# Patient Record
Sex: MALE | Employment: STUDENT | URBAN - METROPOLITAN AREA
[De-identification: names, ages, dates, MRNs, and addresses within clinical notes are randomized per-mention and may not be internally consistent; named-entity substitution may affect disease eponyms.]

---
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
## 2021-02-08 ENCOUNTER — DOCUMENTATION ONLY (OUTPATIENT)
Dept: FAMILY MEDICINE | Facility: CLINIC | Age: 21
End: 2021-02-08

## 2021-02-17 ENCOUNTER — DOCUMENTATION ONLY (OUTPATIENT)
Dept: FAMILY MEDICINE | Facility: CLINIC | Age: 21
End: 2021-02-17

## 2021-02-23 NOTE — PROGRESS NOTES
"[facility] ATHLETIC MEDICINE  [facility]   Sport Psychology Intake Note      Location of Visit: [facility] Athletic Department - Telehealth  Date of Visit: February 8, 2021  Duration of Session: 45 minutes  Referred by: teammate/friend    Sandi Sanchez presented on time for initial telehealth/videoconference. Sandi Sanchez was located at the following address for the appointment: [address] [place] [id number]     Emergency contacts provided:  General: Angie Sanchez (Mother) - [phone] and Jesus Sanchez (Father) - [phone]    The risks and benefits of telehealth and what to do if there is a break in the connection were reviewed. Physical environment/space was confirmed to be secure and private on both ends. The session was both audio and visual on [employer], a secure system that is HIPAA compliant/certified. The telehealth consent form was signed prior to the session; see signed form in chart. The nature and limits of confidentiality, were discussed and Sandi Sanchez stated understanding and consent.      Sandi Sanchez is a 20 year old American male.  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 He is not an international student.     Self-reported Concerns/Symptoms:  Athletic performance  Confidence  Dissatisfaction/unhappiness  Loneliness    Presenting Concern:  Sandi states, \"I have a few things that I want to work on. First of all, I want to just improve my ability to compete/perform and train myself to become the best I can by flipping the switch and turning into a \"killer/closer/competitor\" at any moment. I also have a few things away from sports that I want to work on. I struggle with talking about my feelings and think sports psych could be healthy. Also I want to get better at being present and just overall happier.\"    Suicide and Risk Assessment:  Recent suicidal thoughts: No  Past suicidal thoughts: No  Recent homicidal " thoughts: No  Any attempts in the past: No    Sandi denies current urges to self-harm, homicidal ideation, suicidal ideation, means, plans, or intent.    Mental Status & Observations:  Sandi appeared generally alert and oriented. Dress was appropriate to the weather and occasion. Grooming and hygiene were appropriate. Eye contact was good. Speech was of normal volume and normal. Thought processes were relevant, logical and goal-directed. Thought content was within normal limits with no evidence of psychotic or paranoid features. Memory appeared intact. He exhibited normal motor activity during the appointment. Mood was appropriate with congruent affect. Insight and judgment appeared age appropriate with good focus in session.  Behavior was candid, cooperative and engaging    Family Background:  Sandi reports that he is from [place], [place]; his mother and father are  and currently reside there. Reports that his older brother attends the [employer] and is also on the track team with him. Described supportive/loving relationships with family members. Shared that his childhood was pleasant, competitive with brother at times but healthy. Noted that his father also ran track in college but did not push the sport on him.    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 He is majoring in finance. Reports no concerns related to academic progress. Noted that he has done well academically historically.    Social:  Reports recent romantic break up, no current romantic partner. Noted having a supportive social network of friends.    Athletics:   Sandi is on his second year as a member of the cross country and track and field teams at the [employer]. Reports good relationship with coaches and teammates. Reports having contemplated transferring but ultimately decided to remain. Discussed desire to improve sport performance.    History of medical and mental  health concerns:  Concussion: Yes: two in 2013  Current/past sports injury: No  Nutrition/eating/appetite: No  Body image: No  Sleep: No   Substance use (alcohol, caffeine, tobacco, cannabis, other): No  Family history of substance abuse: No  Medications/vitamins/supplements: OTC allergy medication  Concentration/focus/ADHD: No  Caffeine use: No  PTSD/trauma/abuse: No  Significant loss: No  Known history of mental health in self: No  History of therapy or prescribed medications: No  Known history of mental health in family member(s): No  Legal issues: No  Financial concerns: No    Coping skills, strengths and supports:   Social support system and Use of available services    Clinical impressions or Other:  Sandi arrived to session on time and was actively engaged throughout. Session utilized to review confidentiality, establish rapport, and determine goals for services.    Sandi reports no prior counseling/mental health or performance services and was able to identify improved sport performance as main goal in addition to managing life stressors.    Therapy objectives/goals:  Improve sport performance  Provide support    Therapy follow-up plan:  Individual counseling sessions as needed      Rebecca Oquendo PsyD

## 2021-02-24 NOTE — PROGRESS NOTES
I have reviewed and agree with the document of this note.  I did not personally see the patient.    Jake Crowley, PhD LP, CMPC

## 2021-02-25 NOTE — PROGRESS NOTES
[facility] ATHLETIC MEDICINE  [facility]   Sport Psychology Progress Note      Location of Visit: [facility] Athletic Department - Telehealth  Date of Visit: February 17, 2021  Duration of Session: 45 minutes    Sandi Sanchez presented on time for initial telehealth/videoconference. Sandi Sanchez was located at the following address for the appointment: [address] [place] [address]    Emergency contacts provided:  General: Angie Sanchez (Mother) - [phone] and Jesus Sanchez (Father) - [phone]     The risks and benefits of telehealth and what to do if there is a break in the connection were reviewed. Physical environment/space was confirmed to be secure and private on both ends. The session was both audio and visual on [employer], a secure system that is HIPAA compliant/certified. The telehealth consent form was signed prior to the session; see signed form in chart. The nature and limits of confidentiality, were discussed and Sandi Sanchez stated understanding and consent.      Suicide Assessment:  Sandi denies current urges to self-harm, homicidal ideation, suicidal ideation, means, plans, or intent.    Mental Status & Observations:  Sandi appeared generally alert and oriented. Dress was appropriate to the weather and occasion. Grooming and hygiene were appropriate. Eye contact was good. Speech was of normal volume and normal. Mood was appropriate with congruent affect. Thought processes were relevant, logical and goal-directed. Thought content was within normal limits with no evidence of psychotic or paranoid features. Memory appeared intact. Insight and judgment appeared age appropriate with good focus in session.  He exhibited normal motor activity during the appointment.  Behavior was cooperative and engaging.      Observations and response to counseling:  Sandi arrived to session on time and was actively engaged throughout. He reports no significant changes in mood or stressors  since last encounter. Noted that he completed a sport race last weekend in [place], and although he performed well, lamented on being able to perform better.    Utilized session to reflect on recent sport performance and plan for ways to enhance performance for upcoming races. Mental skills emphasized in session were: diaphragmatic breathing, focus and attention, and utilization of process goals.    Intervention:  Empathetic listening and supportive counseling were provided throughout. Aided in reflection of previous sport performances and identified strength areas and opportunities for growth.    Provided Sandi with exercises/homework to complete (Focus Action Plan and Process Goals Worksheet).    Therapy objectives/goals:  Improve sport performance  Provide support    Therapy follow-up plan:  Individual counseling sessions as needed      Rebecca Oquendo PsyD

## 2021-06-28 DIAGNOSIS — Z13.6 SCREENING FOR HEART DISEASE: Primary | ICD-10-CM

## 2021-07-27 DIAGNOSIS — Z13.6 SCREENING FOR HEART DISEASE: ICD-10-CM

## 2021-07-29 LAB
ATRIAL RATE - MUSE: 45 BPM
DIASTOLIC BLOOD PRESSURE - MUSE: NORMAL MMHG
INTERPRETATION ECG - MUSE: NORMAL
P AXIS - MUSE: 70 DEGREES
PR INTERVAL - MUSE: 166 MS
QRS DURATION - MUSE: 96 MS
QT - MUSE: 446 MS
QTC - MUSE: 385 MS
R AXIS - MUSE: 60 DEGREES
SYSTOLIC BLOOD PRESSURE - MUSE: NORMAL MMHG
T AXIS - MUSE: 20 DEGREES
VENTRICULAR RATE- MUSE: 45 BPM

## 2021-07-30 NOTE — PROGRESS NOTES
EKG clearance  Sandi Sanchez's EKG performed for clearance to participate in intercollegiate athletics at the [employer] has been reviewed on 07/29/21.  Findings are normal: Sinus bradycardia   RSR' in V1-V2   Left ventricular hypertrophy   Early repolarization   Within normal limits for athlete    Additional follow up is not needed at this time.   Follow up tests: none    Sandi Sanchez is cleared to participate at this time.     Divya Bull MD

## 2021-09-21 DIAGNOSIS — R53.83 FATIGUE, UNSPECIFIED TYPE: Primary | ICD-10-CM

## 2021-09-22 ENCOUNTER — LAB (OUTPATIENT)
Dept: LAB | Facility: CLINIC | Age: 21
End: 2021-09-22
Payer: COMMERCIAL

## 2021-09-22 DIAGNOSIS — R53.83 FATIGUE, UNSPECIFIED TYPE: ICD-10-CM

## 2021-09-22 LAB
FERRITIN SERPL-MCNC: 51 NG/ML (ref 26–388)
HGB BLD-MCNC: 15.6 G/DL (ref 13.3–17.7)

## 2021-09-22 PROCEDURE — 85018 HEMOGLOBIN: CPT

## 2021-09-22 PROCEDURE — 82728 ASSAY OF FERRITIN: CPT

## 2021-10-19 ENCOUNTER — DOCUMENTATION ONLY (OUTPATIENT)
Dept: FAMILY MEDICINE | Facility: CLINIC | Age: 21
End: 2021-10-19

## 2021-10-19 NOTE — PROGRESS NOTES
"[facility] ATHLETIC MEDICINE  [facility]   Sport Psychology Progress Note      Location of Visit: [facility] Athletic Department  Date of Visit: October 19, 2021  Duration of Session: 45 minutes  Client: Sandi Sanchez    Emergency contacts provided:  General: Angie Sanchez (Mother) - [phone] and Jesus Sanchez (Father) - [phone]     Client and provider reviewed the nature and limits of confidentiality, were discussed and Sandi Sanchez stated understanding and consent.      Suicide Assessment:  Sandi denies current urges to self-harm, homicidal ideation, suicidal ideation, means, plans, or intent.    Mental Status & Observations:  Sandi appeared generally alert and oriented. Dress was appropriate to the weather and occasion. Grooming and hygiene were appropriate. Eye contact was good. Speech was of normal volume and normal. Mood was appropriate with congruent affect. Thought processes were relevant, logical and goal-directed. Thought content was within normal limits with no evidence of psychotic or paranoid features. Memory appeared intact. Insight and judgment appeared age appropriate with good focus in session.  He exhibited normal motor activity during the appointment.  Behavior was cooperative and engaging.      Observations and response to counseling:  Sandi arrived to session on time and was actively engaged throughout. The client reported distress with \"personal and sport\" concerns. With tears, the client reported he struggles vocalizing his concerns to others and was \"hoping to use sport psychology\" to make changes with this dynamic. Throughout the session, the client seemed to feel more comfortable vocalizing concerns and stated he noticed a change by \"saying things on his mind.\"    Intervention:  Time was spent building rapport and discussing transitioning to a new sport psychology provider. The client reported a history of concerns with comparisons to others (his brother) growing up " "and a sense of \"always wanting more\" with sport performance goals. We discussed the \"cycle of emotions\" that occur when he continues to change his goals that are \"just out of reach\" and feeling a lack of accomplishment even when he does well.     Client and provider discussed establishing initial treatment goals for this semester, the client agreeing the phrase \"finding my voice\" would be an overarching goal to help him address his emotions with others.    Time spent going through an updated intake of sorts, the client denying any concerns/changes since his initial consultation with Dr. Fernandez last February 2021. He reported he is in a good relationship with his girlfriend, denied any SI/HI, and feels supported by his teammates, friends, and family.    Therapy objectives/goals:  Improve sport performance  Provide support    Therapy follow-up plan:  Individual counseling sessions as needed  Consult athletic medicine as needed  Introduce value identification and value-based behaviors      Morro Bundy, PhD,   Sport Psychologist    "

## 2021-11-02 ENCOUNTER — DOCUMENTATION ONLY (OUTPATIENT)
Dept: FAMILY MEDICINE | Facility: CLINIC | Age: 21
End: 2021-11-02

## 2021-11-02 NOTE — PROGRESS NOTES
"[facility] ATHLETIC MEDICINE  [facility]   Sport Psychology Progress Note      Location of Visit: [facility] Athletic Department  Date of Visit: November 2, 2021  Duration of Session: 45 minutes  Client: Sandi Sanchez      Suicide Assessment:  Sandi denies current urges to self-harm, homicidal ideation, suicidal ideation, means, plans, or intent.    Mental Status & Observations:  Sandi appeared generally alert and oriented. Dress was appropriate to the weather and occasion. Grooming and hygiene were appropriate. Eye contact was good. Speech was of normal volume and normal. Mood was appropriate with congruent affect. Thought processes were relevant, logical and goal-directed. Thought content was within normal limits with no evidence of psychotic or paranoid features. Memory appeared intact. Insight and judgment appeared age appropriate with good focus in session.  He exhibited normal motor activity during the appointment.  Behavior was cooperative and engaging.      Observations and response to counseling:  Sandi arrived to session on time and was actively engaged throughout. Client reported he processed content from the previous session, noticing ways he can \"give himself space to achieve goals\" versus pushing goals back and \"never feeling good enough.\"     Intervention:  Time was spent processing his recent meet. The client reported he felt better having space to process his concerns last session and talk openly about \"feeling insecure and not feeling good enough\" when it comes to his day-to-day successes and decisions. The client reported he thought it would be helpful to utilize therapy to \"find his voice\" and also \"figure out\" why he feels anxious/nervous during meet weeks. When queried, the client stated he stresses and feels anxious about things he normally doesn't feel overwhelmed by when it is not the week of a meet. The client and provider went through the ACT Matrix activity to identify " "ways he engages in actions/behaviors that are to \"get away\" from feeling certain ways and ways he moves towards important people/values in his life. The client was encouraged to process and define some of his values he believes he tries to live by. The client thought the Matrix image was helpful and was amenable to the treatment recommendation/process for next session.     Therapy objectives/goals:  Improve sport performance  Provide support    Therapy follow-up plan:  Individual counseling sessions as needed  Consult athletic medicine as needed  Introduce value identification and value-based behaviors      Morro Bundy, PhD, LP  Sport Psychologist    "

## 2021-11-16 ENCOUNTER — DOCUMENTATION ONLY (OUTPATIENT)
Dept: FAMILY MEDICINE | Facility: CLINIC | Age: 21
End: 2021-11-16
Payer: COMMERCIAL

## 2021-11-16 NOTE — PROGRESS NOTES
"[facility] ATHLETIC MEDICINE  [facility]   Sport Psychology Progress Note      Location of Visit: [facility] Athletic Department  Date of Visit: November 16, 2021  Duration of Session: 45 minutes  Client: Sandi Sanchez      Suicide Assessment:  Sandi denies current urges to self-harm, homicidal ideation, suicidal ideation, means, plans, or intent.    Mental Status & Observations:  Sandi appeared generally alert and oriented. Dress was appropriate to the weather and occasion. Grooming and hygiene were appropriate. Eye contact was good. Speech was of normal volume and normal. Mood was appropriate with congruent affect. Thought processes were relevant, logical and goal-directed. Thought content was within normal limits with no evidence of psychotic or paranoid features. Memory appeared intact. Insight and judgment appeared age appropriate with good focus in session.  He exhibited normal motor activity during the appointment.  Behavior was cooperative and engaging.      Observations and response to counseling:  Sandi arrived to session on time and was actively engaged throughout. Client reported he processed content from the previous session, noticing ways he can \"give himself space to achieve goals\" versus pushing goals back and \"never feeling good enough.\"     Intervention:  Time was spent processing his recent meet and sense of confidence going into [employer]. The session segued into his sense of self, self-talk, and self-doubt/negative self talk. The client found it difficult to go through self-compassion exercises in session and noted he struggles with vocalizing \"what he believes he deserves\" (e.g. healthy relationships and running well). With some tears, the client stated he feels his negative self-talk is unhealthy and has been around him for a long time. This provider introduced the road metaphor and time was spent validating the \"highway\" of his negative self-talk and the difficulty or " "challenging \"gravel road\" with vocalizing what he deserves. The client was amenable to the positive self-talk reminder each morning and stated he would vocalize what he thinks he deserves as a person to \"work on the gravel.\"  Therapy objectives/goals:  Improve sport performance  Provide support    Therapy follow-up plan:  Individual counseling sessions as needed  Consult athletic medicine as needed  Introduce value identification and value-based behaviors      Morro Bundy, PhD, LP  Sport Psychologist    "

## 2021-11-30 ENCOUNTER — DOCUMENTATION ONLY (OUTPATIENT)
Dept: FAMILY MEDICINE | Facility: CLINIC | Age: 21
End: 2021-11-30
Payer: COMMERCIAL

## 2021-11-30 NOTE — PROGRESS NOTES
"[facility] ATHLETIC MEDICINE  [facility]   Sport Psychology Progress Note      Location of Visit: [facility] Athletic Department  Date of Visit: November 30, 2021  Duration of Session: 45 minutes  Client: Sandi Sanchez      Suicide Assessment:  Sandi denies current urges to self-harm, homicidal ideation, suicidal ideation, means, plans, or intent.    Mental Status & Observations:  Sandi appeared generally alert and oriented. Dress was appropriate to the weather and occasion. Grooming and hygiene were appropriate. Eye contact was good. Speech was of normal volume and normal. Mood was appropriate with congruent affect. Thought processes were relevant, logical and goal-directed. Thought content was within normal limits with no evidence of psychotic or paranoid features. Memory appeared intact. Insight and judgment appeared age appropriate with good focus in session.  He exhibited normal motor activity during the appointment.  Behavior was cooperative and engaging.      Observations and response to counseling:  Sandi arrived to session on time and was actively engaged throughout. Client reported he processed content from the previous session, noticing ways he can \"give himself space to achieve goals\" versus pushing goals back and \"never feeling good enough.\"     Intervention:  The client reported his affirmations were helpful  when he remembered  to do them in the morning. In session, the client updated his alarms on his cell phone to include the affirmation prompt. When queried, the client stated the past two weeks have gone well for him and he feels more connected to the present moment. We agreed to check-in after winter break to continue addressing sport performance and self-talk concerns when the indoor season starts. Client stated he continues to work on the \"gravel\" road and give his mind more time to reflect on his positive aspects of self.     Therapy objectives/goals:  Improve sport " performance  Provide support    Therapy follow-up plan:  Individual counseling sessions as needed  Consult athletic medicine as needed  Introduce value identification and value-based behaviors      Morro Bundy, PhD,   Sport Psychologist

## 2022-03-05 ENCOUNTER — NURSE TRIAGE (OUTPATIENT)
Dept: NURSING | Facility: CLINIC | Age: 22
End: 2022-03-05
Payer: COMMERCIAL

## 2022-03-05 NOTE — TELEPHONE ENCOUNTER
"Pt calling that he has had blood in his stool for the last couple days.  This occurred about one month ago and it went away.      Reason for Disposition    [1] Rectal bleeding is minimal (e.g., blood just on toilet paper, few drops, streaks on surface of normal formed BM) AND [2] bleeding recurs 3 or more times on treatment    Additional Information    Negative: Shock suspected (e.g., cold/pale/clammy skin, too weak to stand, low BP, rapid pulse)    Negative: Difficult to awaken or acting confused (e.g., disoriented, slurred speech)    Negative: Passed out (i.e., lost consciousness, collapsed and was not responding)    Negative: [1] Vomiting AND [2] contains red blood or black (\"coffee ground\") material  (Exception: few red streaks in vomit that only happened once)    Negative: Sounds like a life-threatening emergency to the triager    Negative: Diarrhea is main symptom    Negative: Stool color other than brown or tan is main concern  (no bleeding and no melena)    Negative: SEVERE rectal bleeding (large blood clots; on and off, or constant bleeding)    Negative: SEVERE dizziness (e.g., unable to stand, requires support to walk, feels like passing out now)    Negative: [1] MODERATE rectal bleeding (small blood clots, passing blood without stool, or toilet water turns red) AND [2] more than once a day    Negative: Pale skin (pallor) of new onset or worsening    Negative: Tarry or jet black-colored stool (not dark green)    Negative: [1] Constant abdominal pain AND [2] present > 2 hours    Negative: Rectal foreign body (i.e., now or within past week;  inserted or swallowed)    Negative: High-risk adult (e.g., prior surgery on aorta, abdominal aortic aneurysm)    Negative: Taking Coumadin (warfarin) or other strong blood thinner, or known bleeding disorder (e.g., thrombocytopenia)    Negative: Known cirrhosis of the liver (or history of liver failure or ascites)    Negative: [1] Colonoscopy AND [2] in past 72 hours    " Negative: Patient sounds very sick or weak to the triager    Negative: MODERATE rectal bleeding (small blood clots, passing blood without stool, or toilet water turns red)    Negative: MILD rectal bleeding (more than just a few drops or streaks)    Negative: Cancer of rectum or intestines (colon)    Negative: Radiation therapy to lower abdomen or pelvis    Protocols used: RECTAL BLEEDING-A-AH

## 2022-04-07 ENCOUNTER — DOCUMENTATION ONLY (OUTPATIENT)
Dept: FAMILY MEDICINE | Facility: CLINIC | Age: 22
End: 2022-04-07
Payer: COMMERCIAL

## 2022-04-08 NOTE — PROGRESS NOTES
"[facility] ATHLETIC MEDICINE  [facility]   Sport Psychology Progress Note      Location of Visit: [facility]  Date of Visit: April 7, 2021  Duration of Session: 45 minutes  Client: Sandi Sanchez      Suicide Assessment:  Sandi denies current urges to self-harm, homicidal ideation, suicidal ideation, means, plans, or intent.    Mental Status & Observations:  Sandi appeared generally alert and oriented. Dress was appropriate to the weather and occasion. Grooming and hygiene were appropriate. Eye contact was good. Speech was of normal volume and normal. Mood was appropriate with congruent affect. Thought processes were relevant, logical and goal-directed. Thought content was within normal limits with no evidence of psychotic or paranoid features. Memory appeared intact. Insight and judgment appeared age appropriate with good focus in session.  He exhibited normal motor activity during the appointment.  Behavior was cooperative and engaging.      Observations and response to counseling:  Sandi arrived to session on time and was actively engaged throughout. Client reported he processed content from the previous sessions last semester, noticing he continues to have a desire to be in the present moment and reduce his \"time in his head thinking about the future.\"    Intervention:  The client presented to his session on time stating he reached out for a session to \"challenge his comfort zone.\" The client stated he feels uncomfortable discussing internal emotions and mental struggles but stated \"it was always helpful so I wanted to push myself to come back.\" Client and provider discussed, validated, and processed automatic thoughts and futuristic thinking. The client stated he was seeking ideas to help him \"be more present.\" The client was introduced to grounding techniques (5-sense countdown, feet on the ground). The client related his experience to previous sessions, stating he " "understands how it will take time and practice to get his mind to \"be more present.\" He was thankful for the session, agreed to practice his interventions, and will follow-up in May before finals week.    Therapy objectives/goals:  Improve sport performance  Provide support    Therapy follow-up plan:  Individual counseling sessions monthly  Consult athletic medicine as needed      Morro Bundy, PhD, LP  Sport Psychologist    "

## 2022-05-05 ENCOUNTER — DOCUMENTATION ONLY (OUTPATIENT)
Dept: FAMILY MEDICINE | Facility: CLINIC | Age: 22
End: 2022-05-05
Payer: COMMERCIAL

## 2022-05-06 NOTE — PROGRESS NOTES
"[facility] ATHLETIC MEDICINE  [facility]   Sport Psychology Progress Note      Location of Visit: [facility] Athletic Department  Date of Visit: May 5, 2022  Duration of Session: 45 minutes  Client: Sandi Sanchez      Suicide Assessment:  Sandi denies current urges to self-harm, homicidal ideation, suicidal ideation, means, plans, or intent.    Mental Status & Observations:  Sandi appeared generally alert and oriented. Dress was appropriate to the weather and occasion. Grooming and hygiene were appropriate. Eye contact was good. Speech was of normal volume and normal. Mood was appropriate with congruent affect. Thought processes were relevant, logical and goal-directed. Thought content was within normal limits with no evidence of psychotic or paranoid features. Memory appeared intact. Insight and judgment appeared age appropriate with good focus in session.  He exhibited normal motor activity during the appointment.  Behavior was cooperative and engaging.      Observations and response to counseling:  Sandi arrived to session on time and was actively engaged throughout. Client reported he processed content from the previous sessions last semester, noticing he continues to have a desire to be in the present moment and reduce his \"time in his head thinking about the future.\"    Intervention:  The client presented to his session on time. Client reported progress with being present, yet some struggles sticking to his pre-race routine. The client reported he struggles with deferring to his older brother on the team if he has suggestions for the training group. The client stated he thinks his routine works for him but defers if he hears his brother has something different. The client stated he believes he automatically does this due to his upbringing and relationship with his brother/family. The client stated processing in session was helpful and \"wasn't expecting to get into family things.\" This " "provider connected his family history to ways to challenge his automatic thoughts (e.g. catch it, check it, challenge it). The client was amenable to the recommendation and was able to relate this to his \"consistent process\" for his pre-race routine and focus on his steps.    Therapy objectives/goals:  Improve sport performance  Provide support    Therapy follow-up plan:  Individual counseling sessions monthly  Consult athletic medicine as needed      Morro Bundy, PhD, LP  Sport Psychologist    "

## 2022-11-08 ENCOUNTER — DOCUMENTATION ONLY (OUTPATIENT)
Dept: FAMILY MEDICINE | Facility: CLINIC | Age: 22
End: 2022-11-08

## 2022-11-08 NOTE — PROGRESS NOTES
"[facility] ATHLETIC MEDICINE  [facility]   Sport Psychology Progress Note      Location of Visit: [facility] Athletic Department  Date of Visit: November 8, 2022  Duration of Session: 45 minutes  Client: Sandi Sanchez      Suicide Assessment:  Sandi denies current urges to self-harm, homicidal ideation, suicidal ideation, means, plans, or intent.    Mental Status & Observations:  Sandi appeared generally alert and oriented. Dress was appropriate to the weather and occasion. Grooming and hygiene were appropriate. Eye contact was good. Speech was of normal volume and normal. Mood was appropriate with congruent affect. Thought processes were relevant, logical and goal-directed. Thought content was within normal limits with no evidence of psychotic or paranoid features. Memory appeared intact. Insight and judgment appeared age appropriate with good focus in session.  He exhibited normal motor activity during the appointment.  Behavior was cooperative and engaging.      Observations and response to counseling:  Sandi arrived to session on time and was actively engaged throughout. Client reported struggling with recent outcomes, stating his frustration stems from being physically fit and in shape but not performing how he believes he is physically capable of.     Intervention:  The client presented to his session on time. Client reported concerns for coming back to sport psych since May 2022 was due to recent meets. Client stated he has not given up in races but feels \"heavy\" around the 2nd mile and is struggling with maintaining his sense of motivation throughout. Client and provider processed his self-doubt and concerns. Client did not seem aware of how his negative self-talk impacts his performance. Provider had client engage in the \"imagery iron arm\" activity, where the client noticed how his strength and response in the activity changed depending on his thoughts (positive vs negative). When " "encouraged to write out 3 affirmations for during a race the client can say to himself as a way to channel this type of mindset, the client struggled naming 3 actions he believed would be helpful. The client stated \"move on\" would be a helpful statement and was willing to put on his lock screen as well as post-its for his daily reminder.    Therapy objectives/goals:  Improve sport performance  Provide support    Therapy follow-up plan:  Individual counseling sessions monthly  Consult athletic medicine as needed      Morro Bundy, PhD, LP  Sport Psychologist    "

## 2022-12-01 ENCOUNTER — DOCUMENTATION ONLY (OUTPATIENT)
Dept: FAMILY MEDICINE | Facility: CLINIC | Age: 22
End: 2022-12-01

## 2022-12-04 NOTE — PROGRESS NOTES
[facility] ATHLETIC MEDICINE  [facility]   Sport Psychology Progress Note      Location of Visit: [facility] Athletic Department  Date of Visit: December 1, 2022  Duration of Session: 45 minutes  Client: Sandi Sanchez      Suicide Assessment:  Sandi denies current urges to self-harm, homicidal ideation, suicidal ideation, means, plans, or intent.    Mental Status & Observations:  Sandi appeared generally alert and oriented. Dress was appropriate to the weather and occasion. Grooming and hygiene were appropriate. Eye contact was good. Speech was of normal volume and normal. Mood was appropriate with congruent affect. Thought processes were relevant, logical and goal-directed. Thought content was within normal limits with no evidence of psychotic or paranoid features. Memory appeared intact. Insight and judgment appeared age appropriate with good focus in session.  He exhibited normal motor activity during the appointment.  Behavior was cooperative and engaging.      Observations and response to counseling:  Sandi arrived to session on time and was actively engaged throughout. Client reported struggling with sense of accepting accomplishments and successes due to history of comparisons to others (brother and dad).    Intervention:  Client stated he raced his best [employer] sections race a couple weeks ago. Although happy, client stated he struggled accepting and feeling happy. Client opened up about history of comparisons to his older brother and feeling like he has had support from his parents, but it is usually compared to others. Client reported some experiences growing up, feeling invalidated by his brother and dad. Client and provider processed client's mixed emotions of loving his family while also feeling upset by how he was treated. Client engaged in letter to past self exercise as a way to see how he would show himself support growing up. Client stated it made him think more about how he is  proud of himself and what he has learned so far.    Therapy objectives/goals:  Improve sport performance  Provide support    Therapy follow-up plan:  Individual counseling sessions monthly  Consult athletic medicine as needed      Morro Bundy, PhD, LP  Sport Psychologist

## 2022-12-08 DIAGNOSIS — R53.83 FATIGUE, UNSPECIFIED TYPE: Primary | ICD-10-CM

## 2022-12-12 ENCOUNTER — LAB (OUTPATIENT)
Dept: LAB | Facility: CLINIC | Age: 22
End: 2022-12-12
Payer: COMMERCIAL

## 2022-12-12 DIAGNOSIS — R53.83 FATIGUE, UNSPECIFIED TYPE: ICD-10-CM

## 2022-12-12 LAB
FERRITIN SERPL-MCNC: 56 NG/ML (ref 31–409)
HGB BLD-MCNC: 15.3 G/DL (ref 13.3–17.7)

## 2022-12-12 PROCEDURE — 82728 ASSAY OF FERRITIN: CPT

## 2022-12-12 PROCEDURE — 85018 HEMOGLOBIN: CPT

## 2022-12-15 ENCOUNTER — DOCUMENTATION ONLY (OUTPATIENT)
Dept: FAMILY MEDICINE | Facility: CLINIC | Age: 22
End: 2022-12-15

## 2022-12-16 NOTE — PROGRESS NOTES
"[facility] ATHLETIC MEDICINE  [facility]   Sport Psychology Progress Note      Location of Visit: [facility] Athletic Department  Date of Visit: December 15, 2022  Duration of Session: 45 minutes  Client: Sandi Sanchez    Suicide Assessment:  Sandi denies current urges to self-harm, homicidal ideation, suicidal ideation, means, plans, or intent.    Mental Status & Observations:  Sandi appeared generally alert and oriented. Dress was appropriate to the weather and occasion. Grooming and hygiene were appropriate. Eye contact was good. Speech was of normal volume and normal. Mood was appropriate with congruent affect. Thought processes were relevant, logical and goal-directed. Thought content was within normal limits with no evidence of psychotic or paranoid features. Memory appeared intact. Insight and judgment appeared age appropriate with good focus in session.  He exhibited normal motor activity during the appointment.  Behavior was cooperative and engaging.      Observations and response to counseling:  Sandi arrived to session on time and was actively engaged throughout. Client reported struggling with sense of accepting accomplishments and successes due to history of comparisons to others (brother and dad).     Intervention:  Client reported a desire to \"unpack\" his desire to \"always win\" in things and in arguments. The client reported he feels like he has an unhealthy way of \"winning arguments\", stating he will either put others down or keep \"harping\" on people he cares about if he is right about something. Client stated he does not know why he does this, and often feels pushed to say things with the people he is closest to. Provider gently challenged client with connecting his current behaviors with past experiences with his parents and brother. Client was engaged with the \"Stop mindfulness\" approach of recognizing the people he argues with the most are the ones that matter to him the most. " Client stated it was a conversation with his girlfriend and mom that stood out to him the most as having a desire to change and was open to processing and writing down current examples where he finds himself in an argument.    Therapy objectives/goals:  Improve sport performance  Provide support    Therapy follow-up plan:  Individual counseling sessions monthly  Consult athletic medicine as needed      Morro Bundy, PhD,   Sport Psychologist

## 2023-01-17 ENCOUNTER — DOCUMENTATION ONLY (OUTPATIENT)
Dept: FAMILY MEDICINE | Facility: CLINIC | Age: 23
End: 2023-01-17

## 2023-01-17 NOTE — PROGRESS NOTES
"[facility] ATHLETIC MEDICINE  [facility]   Sport Psychology Progress Note      Location of Visit: [facility] Athletic Department - Room 293D  Date of Visit: January 17, 2023  Duration of Session: 45 minutes  Client: Sandi Sanchez    Suicide Assessment:  Sandi denies current urges to self-harm, homicidal ideation, suicidal ideation, means, plans, or intent.    Mental Status & Observations:  Sandi appeared generally alert and oriented. Dress was appropriate to the weather and occasion. Grooming and hygiene were appropriate. Eye contact was good. Speech was of normal volume and normal. Mood was appropriate with congruent affect. Thought processes were relevant, logical and goal-directed. Thought content was within normal limits with no evidence of psychotic or paranoid features. Memory appeared intact. Insight and judgment appeared age appropriate with good focus in session.  He exhibited normal motor activity during the appointment.  Behavior was cooperative and engaging.      Observations and response to counseling:  Sandi arrived to session on time and was actively engaged throughout. Client reported struggling with sense of accepting accomplishments and successes due to history of comparisons to others (brother and dad).     Intervention:  Client reported progress and resisting urges to \"act on winning arguments\" over break. Client stated it was helpful to be mindful of times he can get competitive and he \"kept himself\" from saying certain things to family members and his girlfriend, noticing he was able to maintain positive outcomes and engagements. Session segued to his outcome goal of wanting a sub-4 minute mile, validating his outcome goal and reframing his mindset to training and being present, over needing to \"feel a certain way.\" Client stated the difference of \"expectations versus process\" was helpful to identify.    Therapy objectives/goals:  Improve sport performance  Provide " support    Therapy follow-up plan:  Individual counseling sessions monthly  Consult athletic medicine as needed      Morro Bundy, PhD, LP  Sport Psychologist

## 2023-02-09 ENCOUNTER — DOCUMENTATION ONLY (OUTPATIENT)
Dept: FAMILY MEDICINE | Facility: CLINIC | Age: 23
End: 2023-02-09

## 2023-02-09 NOTE — PROGRESS NOTES
"[facility] ATHLETIC MEDICINE  [facility]   Sport Psychology Progress Note      Location of Visit: [facility] Athletic Department - Room 293D  Date of Visit: February 9, 2023  Duration of Session: 45 minutes  Client: Sandi Sanchez    Suicide Assessment:  Sandi denies current urges to self-harm, homicidal ideation, suicidal ideation, means, plans, or intent.    Mental Status & Observations:  Sandi appeared generally alert and oriented. Dress was appropriate to the weather and occasion. Grooming and hygiene were appropriate. Eye contact was good. Speech was of normal volume and normal. Mood was appropriate with congruent affect. Thought processes were relevant, logical and goal-directed. Thought content was within normal limits with no evidence of psychotic or paranoid features. Memory appeared intact. Insight and judgment appeared age appropriate with good focus in session.  He exhibited normal motor activity during the appointment.  Behavior was cooperative and engaging.      Observations and response to counseling:  Sandi arrived to session on time and was actively engaged throughout. Client reported struggling with sense of accepting accomplishments and successes due to history of comparisons to others (brother and dad).     Intervention:  Client reported sense of growth since last year mentally and physically. Time was spent processing upcoming meet and planning his mental strategies. Client stated the reminders and intentionality around his game plan has been helpful. Client took time to write down his \"Mile\" plan of having reminders to be present, trust he has trained harder than ever as well as recovered well, and to \"Compete\" as a focus cue. Client also introduced the lagging versus leading mentality of competition and focusing on the process to results has been more helpful yet giving him psychological flexibility to be aware of his thoughts when he thinks about time.     Therapy " objectives/goals:  Improve sport performance  Provide support    Therapy follow-up plan:  Individual counseling sessions monthly  Consult athletic medicine as needed      Morro Bundy, PhD, LP  Sport Psychologist

## 2023-02-23 ENCOUNTER — DOCUMENTATION ONLY (OUTPATIENT)
Dept: FAMILY MEDICINE | Facility: CLINIC | Age: 23
End: 2023-02-23

## 2023-02-27 NOTE — PROGRESS NOTES
"[facility] ATHLETIC MEDICINE  [facility]   Sport Psychology Progress Note      Location of Visit: [facility] Athletic Department - Room 293D  Date of Visit: February 23, 2023  Duration of Session: 50 minutes  Client: Sandi Sanchez    Suicide Assessment:  Sandi denies current urges to self-harm, homicidal ideation, suicidal ideation, means, plans, or intent.    Mental Status & Observations:  Sandi appeared generally alert and oriented. Dress was appropriate to the weather and occasion. Grooming and hygiene were appropriate. Eye contact was good. Speech was of normal volume and normal. Mood was appropriate with congruent affect. Thought processes were relevant, logical and goal-directed. Thought content was within normal limits with no evidence of psychotic or paranoid features. Memory appeared intact. Insight and judgment appeared age appropriate with good focus in session.  He exhibited normal motor activity during the appointment.  Behavior was cooperative and engaging.      Observations and response to counseling:  Sandi arrived to session on time and was actively engaged throughout. Client reported struggling with sense of accepting accomplishments and successes due to history of comparisons to others (brother and dad).     Intervention:  Client reported he is going to [place] tomorrow for a meet. One question the client processed in session was around finding a balance of thinking about a race/worrying about it. We processed his awareness and growing definition of effort to mean more than \"physical output\" and ways to put effort towards his tactics as well.    Session segued to family, processing that he feels like a vessel for family to run a certain time. Client encouraged to image and reflect on his own goals - focused and carefree.     Therapy objectives/goals:  Improve sport performance  Provide support    Therapy follow-up plan:  Individual counseling sessions monthly  Consult athletic " medicine as needed      Morro Bundy, PhD, LP  Sport Psychologist

## 2023-03-14 ENCOUNTER — DOCUMENTATION ONLY (OUTPATIENT)
Dept: FAMILY MEDICINE | Facility: CLINIC | Age: 23
End: 2023-03-14

## 2023-03-14 NOTE — PROGRESS NOTES
[facility] ATHLETIC MEDICINE  [facility]   Sport Psychology Progress Note      Location of Visit: [facility] Athletic Department - Room 293D  Date of Visit: March 14, 2023  Duration of Session: 50 minutes  Client: Sandi Sanchez    Suicide Assessment:  Sandi denies current urges to self-harm, homicidal ideation, suicidal ideation, means, plans, or intent.    Mental Status & Observations:  Sandi appeared generally alert and oriented. Dress was appropriate to the weather and occasion. Grooming and hygiene were appropriate. Eye contact was good. Speech was of normal volume and normal. Mood was appropriate with congruent affect. Thought processes were relevant, logical and goal-directed. Thought content was within normal limits with no evidence of psychotic or paranoid features. Memory appeared intact. Insight and judgment appeared age appropriate with good focus in session.  He exhibited normal motor activity during the appointment.  Behavior was cooperative and engaging.      Observations and response to counseling:  Sandi arrived to session on time and was actively engaged throughout. Client reported struggling with sense of accepting accomplishments and successes due to history of comparisons to others (brother and dad).     Intervention:  Processed indoor outcomes of hitting 4:01 and feeling mixed about the work he put in and the time of training and not hitting his under 4:00 mile goal. Client and provider processed ways to validate his feelings about the outcomes and yet be proud of his process. Client reported discussing in session was helpful. Client seemed to be more psychologically flexible with understanding ways he can continue to strive for goals and be happy with his process while engaging in the training.     Therapy objectives/goals:  Improve sport performance  Provide support    Therapy follow-up plan:  Individual counseling sessions monthly  Consult athletic medicine as  needed      Morro Bundy, PhD,   Sport Psychologist

## 2023-04-11 ENCOUNTER — DOCUMENTATION ONLY (OUTPATIENT)
Dept: FAMILY MEDICINE | Facility: CLINIC | Age: 23
End: 2023-04-11

## 2023-04-12 NOTE — PROGRESS NOTES
"[facility] ATHLETIC MEDICINE  [facility]   Sport Psychology Progress Note      Location of Visit: [facility] Athletic Department - Room 293D  Date of Visit: April 11, 2023  Duration of Session: 50 minutes  Client: Sandi Sanchez    Suicide Assessment:  Sandi denies current urges to self-harm, homicidal ideation, suicidal ideation, means, plans, or intent.    Mental Status & Observations:  Sandi appeared generally alert and oriented. Dress was appropriate to the weather and occasion. Grooming and hygiene were appropriate. Eye contact was good. Speech was of normal volume and normal. Mood was appropriate with congruent affect. Thought processes were relevant, logical and goal-directed. Thought content was within normal limits with no evidence of psychotic or paranoid features. Memory appeared intact. Insight and judgment appeared age appropriate with good focus in session.  He exhibited normal motor activity during the appointment.  Behavior was cooperative and engaging.      Observations and response to counseling:  Sandi arrived to session on time and was actively engaged throughout. Client reported struggling with sense of accepting accomplishments and successes due to history of comparisons to others (brother and dad).     Intervention:  Processed goals and preparation for 1500 outdoors and balance of knowing he's put the work in but is not currently getting times he wants.     Client opened up in session about family dynamics and sense of negative self-talk stemming from family. Client reported history of comparisons to brother, being in his shadow, and true self-criticism is related to \"not getting praise from dad growing up.\" Client stated he gets support now but responds in a critical way to his dad due to his upbringing and history. We processed and agreed continued work addressing his history of lacking support seems to be the main factor of his current self-criticism. Client and provider " "worked on \"what went well\" from last weekend identifying ways the client automatically goes to criticism but also deepened his understanding of what went well for him.    Client was amenable to practicing daily evening journaling of processing what is good about the day to help his mind get used to consciously being aware of the present moment.    Therapy objectives/goals:  Improve sport performance  Provide support    Therapy follow-up plan:  Individual counseling sessions monthly  Consult athletic medicine as needed      Morro Bundy, PhD, LP  Sport Psychologist    "

## 2023-04-26 ENCOUNTER — DOCUMENTATION ONLY (OUTPATIENT)
Dept: FAMILY MEDICINE | Facility: CLINIC | Age: 23
End: 2023-04-26

## 2023-04-27 NOTE — PROGRESS NOTES
"[facility] ATHLETIC MEDICINE  [facility]   Sport Psychology Progress Note      Location of Visit: [facility] Athletic Department [facility]  Date of Visit: April 26, 2023  Duration of Session: 50 minutes  Client: Sandi Sanchez    Suicide Assessment:  Sandi denies current urges to self-harm, homicidal ideation, suicidal ideation, means, plans, or intent.    Mental Status & Observations:  Sandi appeared generally alert and oriented. Dress was appropriate to the weather and occasion. Grooming and hygiene were appropriate. Eye contact was good. Speech was of normal volume and normal. Mood was appropriate with congruent affect. Thought processes were relevant, logical and goal-directed. Thought content was within normal limits with no evidence of psychotic or paranoid features. Memory appeared intact. Insight and judgment appeared age appropriate with good focus in session.  He exhibited normal motor activity during the appointment.  Behavior was cooperative and engaging.      Observations and response to counseling:  Sandi arrived to session on time and was actively engaged throughout. Client reported struggling with sense of accepting accomplishments and successes due to history of comparisons to others (brother and dad).     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 Client stated he is excited to run the 5k tomorrow at the [employer] and stated it's a fun race for him, has nothing to lose, wants to run fast, and is excited family will be there. Time spent acknowledging how he felt connected to the process/Training aligning with an outcome. Client stated he felt like he truly was able to feel good and happy about his outcome and stated teammates/coaches were surprised to see his \"funny\" side come out the " following day.       Therapy objectives/goals:  Improve sport performance  Provide support    Therapy follow-up plan:  Individual counseling sessions monthly  Consult athletic medicine as needed      Morro Bundy, PhD, LP  Sport Psychologist

## 2023-05-07 ENCOUNTER — HEALTH MAINTENANCE LETTER (OUTPATIENT)
Age: 23
End: 2023-05-07

## 2023-05-16 ENCOUNTER — DOCUMENTATION ONLY (OUTPATIENT)
Dept: FAMILY MEDICINE | Facility: CLINIC | Age: 23
End: 2023-05-16

## 2023-05-16 NOTE — PROGRESS NOTES
"[facility] ATHLETIC MEDICINE  [facility]   Sport Psychology Progress Note      Location of Visit: [facility] Athletic Department rm 293D  Date of Visit: May 16, 2023  Duration of Session: 50 minutes  Client: Sandi Sanchez    Suicide Assessment:  Sandi denies current urges to self-harm, homicidal ideation, suicidal ideation, means, plans, or intent.    Mental Status & Observations:  Sandi appeared generally alert and oriented. Dress was appropriate to the weather and occasion. Grooming and hygiene were appropriate. Eye contact was good. Speech was of normal volume and normal. Mood was appropriate with congruent affect. Thought processes were relevant, logical and goal-directed. Thought content was within normal limits with no evidence of psychotic or paranoid features. Memory appeared intact. Insight and judgment appeared age appropriate with good focus in session.  He exhibited normal motor activity during the appointment.  Behavior was cooperative and engaging.      Observations and response to counseling:  Sandi arrived to session on time and was actively engaged throughout. Client reported struggling with sense of accepting accomplishments and successes due to history of comparisons to others (brother and dad).     Intervention:  Client spent time processing and grieving his sense and emotions of \"letting his team down\" in the 1500 final at [employer]. Client reported a sense of hurt and \"not feeling this down\" after a race in a long time. Client and provider validated his emotions and tears around his work and sense of effort put into the race, but not having his outcomes match his work ethic. With tears, client stated he felt like he has great support from others and would not think his teammates would be letting him down with a poor race but \"sees himself differently.\" Time was spent addressing this dynamic while also providing space of support at this time. Client stated he felt like he " "would be ready later in the summer to address his self-talk and sense of pressure he puts on himself. Client and provider agreed to meet for a follow-up once the client has a better understanding of his schedule. Client stated he was thankful for the time in session to process \"his hurt out loud\" and stated it was helpful to vocalize.       Therapy objectives/goals:  Improve sport performance  Provide support    Therapy follow-up plan:  Individual counseling sessions monthly  Consult athletic medicine as needed      Morro Bundy, PhD, LP  Sport Psychologist    "

## 2024-02-19 ENCOUNTER — OFFICE VISIT (OUTPATIENT)
Dept: FAMILY MEDICINE | Facility: CLINIC | Age: 24
End: 2024-02-19
Payer: COMMERCIAL

## 2024-02-19 VITALS
DIASTOLIC BLOOD PRESSURE: 81 MMHG | HEIGHT: 74 IN | WEIGHT: 161 LBS | BODY MASS INDEX: 20.66 KG/M2 | HEART RATE: 73 BPM | SYSTOLIC BLOOD PRESSURE: 132 MMHG

## 2024-02-19 DIAGNOSIS — R53.83 OTHER FATIGUE: Primary | ICD-10-CM

## 2024-02-19 NOTE — PROGRESS NOTES
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  Feels like crash and burn. Times are slower.   -Eats red  -Takes iron (325 mg) q night for many years.  No h/o anemia.    -Hasn't changed his diet recently  -Vitamin D supplement as well  -Legs feel tired.   -Feels great outside of performing including practices.   -Feels happy  -Weight stable   -Started Feb 3rd until now.   -Sleep: 8-9 hours per night  -Feels rested when he wakes up  -80-85 miles per week max for this training block.  He has done up to  miles per week but he didn't do well with that plan and was super tired in the past.   -0-1 day off during the last training block which was 3 months long.   -Cold over winter break.  No COVID test.  Minor illness.  +Vaccinated and booster x 2  -ALbuterol MDI intermittently (cold temps are a trigger and he has coughing p workout).  Used to take Singulair.     Current Outpatient Medications   Medication    ferrous sulfate 220 (44 Fe) MG/5ML ELIX    fexofenadine-pseudoePHEDrine (ALLEGRA-D 24) 180-240 MG 24 hr tablet     No current facility-administered medications for this visit.               O:    NAD  /81   Pulse 73   Ht 1.88 m (6' 2\")   Wt 73 kg (161 lb)   BMI 20.67 kg/m      HEENT: neg  Neck: No lad or thyromegaly  Lungs; cta  Heart; rrr  Abd: benign  Ext:  No edema  Skin: no rash   Latest Reference Range & Units 09/22/21 07:11 12/12/22 07:22   Ferritin 31 - 409 ng/mL 51 56   Hemoglobin 13.3 - 17.7 g/dL 15.6 15.3       A:  Performance decline in a [place] distance runner with likely unfueling for the amount of training that he has been doing. Inadequate rest days in the last 3 months.     P:  Check labs  DIscussed underfueling and the importance of rest days  Meet with Dietician  Discussed needing three meals per day and three snacks with two items per snack.  Increase caloric intake with beverages as well.   Alex Romero ATC was present for the appointment.     > " 30 min of total time spent in one-on-one evalution and discussion with patient regarding nature of problem, course, prior treatments, and therapeutic options,> 50% of which was spent in counseling and coordination of care:      Barbara Salazar MD, CAQ, FACSM, CCD  [facility]  Sports Medicine and Bone Health  Team Physician;  Athletics    Addendum:  Labs reviewed and essentially wnl. Notified AT of results.   Continue the current plan listed above.      Latest Reference Range & Units 02/20/24 13:48   Sodium 135 - 145 mmol/L 142   Potassium 3.4 - 5.3 mmol/L 4.2   Chloride 98 - 107 mmol/L 102   Carbon Dioxide (CO2) 22 - 29 mmol/L 30 (H)   Urea Nitrogen 6.0 - 20.0 mg/dL 17.8   Creatinine 0.67 - 1.17 mg/dL 0.95   GFR Estimate >60 mL/min/1.73m2 >90   Calcium 8.6 - 10.0 mg/dL 10.3 (H)   Anion Gap 7 - 15 mmol/L 10   Albumin 3.5 - 5.2 g/dL 5.0   Protein Total 6.4 - 8.3 g/dL 7.8   Alkaline Phosphatase 40 - 150 U/L 113   ALT 0 - 70 U/L 33   AST 0 - 45 U/L 43   Bilirubin Total <=1.2 mg/dL 0.6   Ferritin 31 - 409 ng/mL 103   Glucose 70 - 99 mg/dL 87   Iron 61 - 157 ug/dL 105   Iron Binding Capacity 240 - 430 ug/dL 317   Iron Sat Index 15 - 46 % 33   TSH 0.30 - 4.20 uIU/mL 3.13   Vitamin D, Total (25-Hydroxy) 20 - 50 ng/mL 49   WBC 4.0 - 11.0 10e3/uL 6.6   Hemoglobin 13.3 - 17.7 g/dL 15.7   Hematocrit 40.0 - 53.0 % 46.1   Platelet Count 150 - 450 10e3/uL 247   RBC Count 4.40 - 5.90 10e6/uL 5.20   MCV 78 - 100 fL 89   MCH 26.5 - 33.0 pg 30.2   MCHC 31.5 - 36.5 g/dL 34.1   RDW 10.0 - 15.0 % 13.2   % Neutrophils % 50   % Lymphocytes % 31   % Monocytes % 7   % Eosinophils % 11   % Basophils % 1   Absolute Basophils 0.0 - 0.2 10e3/uL 0.1   Absolute Eosinophils 0.0 - 0.7 10e3/uL 0.8 (H)   Absolute Immature Granulocytes <=0.4 10e3/uL 0.0   Absolute Lymphocytes 0.8 - 5.3 10e3/uL 2.1   Absolute Monocytes 0.0 - 1.3 10e3/uL 0.5   % Immature Granulocytes % 0   Absolute Neutrophils 1.6 - 8.3 10e3/uL 3.3   Absolute NRBCs  10e3/uL 0.0   NRBCs per 100 WBC <1 /100 0   (H): Data is abnormally high  Barbara Salazar MD, CAQ, FACSM, CCD  [facility]  Sports Medicine and Bone Health  Team Physician;  Athletics

## 2024-02-19 NOTE — LETTER
"  2/19/2024      RE: Sandi Sanchez  [address]  [place] [address]     Dear Colleague,    Thank you for referring your patient, Sandi Sanchez, to the [facility]. Please see a copy of my visit note below.    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  Feels like crash and burn. Times are slower.   -Eats red  -Takes iron (325 mg) q night for many years.  No h/o anemia.    -Hasn't changed his diet recently  -Vitamin D supplement as well  -Legs feel tired.   -Feels great outside of performing including practices.   -Feels happy  -Weight stable   -Started Feb 3rd until now.   -Sleep: 8-9 hours per night  -Feels rested when he wakes up  -80-85 miles per week max for this training block.  He has done up to  miles per week but he didn't do well with that plan and was super tired in the past.   -0-1 day off during the last training block which was 3 months long.   -Cold over winter break.  No COVID test.  Minor illness.  +Vaccinated and booster x 2  -ALbuterol MDI intermittently (cold temps are a trigger and he has coughing p workout).  Used to take Singulair.     Current Outpatient Medications   Medication    ferrous sulfate 220 (44 Fe) MG/5ML ELIX    fexofenadine-pseudoePHEDrine (ALLEGRA-D 24) 180-240 MG 24 hr tablet     No current facility-administered medications for this visit.               O:    NAD  /81   Pulse 73   Ht 1.88 m (6' 2\")   Wt 73 kg (161 lb)   BMI 20.67 kg/m      HEENT: neg  Neck: No lad or thyromegaly  Lungs; cta  Heart; rrr  Abd: benign  Ext:  No edema  Skin: no rash   Latest Reference Range & Units 09/22/21 07:11 12/12/22 07:22   Ferritin 31 - 409 ng/mL 51 56   Hemoglobin 13.3 - 17.7 g/dL 15.6 15.3       A:  Performance decline in a [place] distance runner with likely unfueling for the amount of training that he has been doing. Inadequate rest days in the last 3 months.     P:  Check labs  DIscussed underfueling and the " importance of rest days  Meet with Dietician  Discussed needing three meals per day and three snacks with two items per snack.  Increase caloric intake with beverages as well.   Alex Romero ATC was present for the appointment.     > 30 min of total time spent in one-on-one evalution and discussion with patient regarding nature of problem, course, prior treatments, and therapeutic options,> 50% of which was spent in counseling and coordination of care:      Barbara Salazar MD, CAQ, FACSM, CCD  [facility]  Sports Medicine and Bone Health  Team Physician;  Athletics    Addendum:  Labs reviewed and essentially wnl. Notified AT of results.   Continue the current plan listed above.      Latest Reference Range & Units 02/20/24 13:48   Sodium 135 - 145 mmol/L 142   Potassium 3.4 - 5.3 mmol/L 4.2   Chloride 98 - 107 mmol/L 102   Carbon Dioxide (CO2) 22 - 29 mmol/L 30 (H)   Urea Nitrogen 6.0 - 20.0 mg/dL 17.8   Creatinine 0.67 - 1.17 mg/dL 0.95   GFR Estimate >60 mL/min/1.73m2 >90   Calcium 8.6 - 10.0 mg/dL 10.3 (H)   Anion Gap 7 - 15 mmol/L 10   Albumin 3.5 - 5.2 g/dL 5.0   Protein Total 6.4 - 8.3 g/dL 7.8   Alkaline Phosphatase 40 - 150 U/L 113   ALT 0 - 70 U/L 33   AST 0 - 45 U/L 43   Bilirubin Total <=1.2 mg/dL 0.6   Ferritin 31 - 409 ng/mL 103   Glucose 70 - 99 mg/dL 87   Iron 61 - 157 ug/dL 105   Iron Binding Capacity 240 - 430 ug/dL 317   Iron Sat Index 15 - 46 % 33   TSH 0.30 - 4.20 uIU/mL 3.13   Vitamin D, Total (25-Hydroxy) 20 - 50 ng/mL 49   WBC 4.0 - 11.0 10e3/uL 6.6   Hemoglobin 13.3 - 17.7 g/dL 15.7   Hematocrit 40.0 - 53.0 % 46.1   Platelet Count 150 - 450 10e3/uL 247   RBC Count 4.40 - 5.90 10e6/uL 5.20   MCV 78 - 100 fL 89   MCH 26.5 - 33.0 pg 30.2   MCHC 31.5 - 36.5 g/dL 34.1   RDW 10.0 - 15.0 % 13.2   % Neutrophils % 50   % Lymphocytes % 31   % Monocytes % 7   % Eosinophils % 11   % Basophils % 1   Absolute Basophils 0.0 - 0.2 10e3/uL 0.1   Absolute Eosinophils 0.0 - 0.7 10e3/uL 0.8 (H)    Absolute Immature Granulocytes <=0.4 10e3/uL 0.0   Absolute Lymphocytes 0.8 - 5.3 10e3/uL 2.1   Absolute Monocytes 0.0 - 1.3 10e3/uL 0.5   % Immature Granulocytes % 0   Absolute Neutrophils 1.6 - 8.3 10e3/uL 3.3   Absolute NRBCs 10e3/uL 0.0   NRBCs per 100 WBC <1 /100 0   (H): Data is abnormally high  Barbara Salazar MD, CAQ, FACSM, CCD  [facility]  Sports Medicine and Bone Health   Team Physician;  Athletics

## 2024-02-20 ENCOUNTER — LAB (OUTPATIENT)
Dept: LAB | Facility: CLINIC | Age: 24
End: 2024-02-20
Payer: COMMERCIAL

## 2024-02-20 DIAGNOSIS — R53.83 OTHER FATIGUE: ICD-10-CM

## 2024-02-20 LAB
ALBUMIN SERPL BCG-MCNC: 5 G/DL (ref 3.5–5.2)
ALP SERPL-CCNC: 113 U/L (ref 40–150)
ALT SERPL W P-5'-P-CCNC: 33 U/L (ref 0–70)
ANION GAP SERPL CALCULATED.3IONS-SCNC: 10 MMOL/L (ref 7–15)
AST SERPL W P-5'-P-CCNC: 43 U/L (ref 0–45)
BASOPHILS # BLD AUTO: 0.1 10E3/UL (ref 0–0.2)
BASOPHILS NFR BLD AUTO: 1 %
BILIRUB SERPL-MCNC: 0.6 MG/DL
BUN SERPL-MCNC: 17.8 MG/DL (ref 6–20)
CALCIUM SERPL-MCNC: 10.3 MG/DL (ref 8.6–10)
CHLORIDE SERPL-SCNC: 102 MMOL/L (ref 98–107)
CREAT SERPL-MCNC: 0.95 MG/DL (ref 0.67–1.17)
DEPRECATED HCO3 PLAS-SCNC: 30 MMOL/L (ref 22–29)
EGFRCR SERPLBLD CKD-EPI 2021: >90 ML/MIN/1.73M2
EOSINOPHIL # BLD AUTO: 0.8 10E3/UL (ref 0–0.7)
EOSINOPHIL NFR BLD AUTO: 11 %
ERYTHROCYTE [DISTWIDTH] IN BLOOD BY AUTOMATED COUNT: 13.2 % (ref 10–15)
FERRITIN SERPL-MCNC: 103 NG/ML (ref 31–409)
GLUCOSE SERPL-MCNC: 87 MG/DL (ref 70–99)
HCT VFR BLD AUTO: 46.1 % (ref 40–53)
HGB BLD-MCNC: 15.7 G/DL (ref 13.3–17.7)
IMM GRANULOCYTES # BLD: 0 10E3/UL
IMM GRANULOCYTES NFR BLD: 0 %
IRON BINDING CAPACITY (ROCHE): 317 UG/DL (ref 240–430)
IRON SATN MFR SERPL: 33 % (ref 15–46)
IRON SERPL-MCNC: 105 UG/DL (ref 61–157)
LYMPHOCYTES # BLD AUTO: 2.1 10E3/UL (ref 0.8–5.3)
LYMPHOCYTES NFR BLD AUTO: 31 %
MCH RBC QN AUTO: 30.2 PG (ref 26.5–33)
MCHC RBC AUTO-ENTMCNC: 34.1 G/DL (ref 31.5–36.5)
MCV RBC AUTO: 89 FL (ref 78–100)
MONOCYTES # BLD AUTO: 0.5 10E3/UL (ref 0–1.3)
MONOCYTES NFR BLD AUTO: 7 %
NEUTROPHILS # BLD AUTO: 3.3 10E3/UL (ref 1.6–8.3)
NEUTROPHILS NFR BLD AUTO: 50 %
NRBC # BLD AUTO: 0 10E3/UL
NRBC BLD AUTO-RTO: 0 /100
PLATELET # BLD AUTO: 247 10E3/UL (ref 150–450)
POTASSIUM SERPL-SCNC: 4.2 MMOL/L (ref 3.4–5.3)
PROT SERPL-MCNC: 7.8 G/DL (ref 6.4–8.3)
RBC # BLD AUTO: 5.2 10E6/UL (ref 4.4–5.9)
SODIUM SERPL-SCNC: 142 MMOL/L (ref 135–145)
TSH SERPL DL<=0.005 MIU/L-ACNC: 3.13 UIU/ML (ref 0.3–4.2)
VIT D+METAB SERPL-MCNC: 49 NG/ML (ref 20–50)
WBC # BLD AUTO: 6.6 10E3/UL (ref 4–11)

## 2024-02-20 PROCEDURE — 83540 ASSAY OF IRON: CPT | Performed by: PATHOLOGY

## 2024-02-20 PROCEDURE — 80053 COMPREHEN METABOLIC PANEL: CPT | Performed by: PATHOLOGY

## 2024-02-20 PROCEDURE — 82306 VITAMIN D 25 HYDROXY: CPT | Performed by: FAMILY MEDICINE

## 2024-02-20 PROCEDURE — 84443 ASSAY THYROID STIM HORMONE: CPT | Performed by: PATHOLOGY

## 2024-02-20 PROCEDURE — 82728 ASSAY OF FERRITIN: CPT | Performed by: PATHOLOGY

## 2024-02-20 PROCEDURE — 36415 COLL VENOUS BLD VENIPUNCTURE: CPT | Performed by: PATHOLOGY

## 2024-02-20 PROCEDURE — 99000 SPECIMEN HANDLING OFFICE-LAB: CPT | Performed by: PATHOLOGY

## 2024-02-20 PROCEDURE — 83550 IRON BINDING TEST: CPT | Performed by: PATHOLOGY

## 2024-02-20 PROCEDURE — 85025 COMPLETE CBC W/AUTO DIFF WBC: CPT | Performed by: PATHOLOGY

## 2024-07-14 ENCOUNTER — HEALTH MAINTENANCE LETTER (OUTPATIENT)
Age: 24
End: 2024-07-14

## 2025-07-19 ENCOUNTER — HEALTH MAINTENANCE LETTER (OUTPATIENT)
Age: 25
End: 2025-07-19